# Patient Record
Sex: MALE | Race: WHITE
[De-identification: names, ages, dates, MRNs, and addresses within clinical notes are randomized per-mention and may not be internally consistent; named-entity substitution may affect disease eponyms.]

---

## 2022-04-11 ENCOUNTER — HOSPITAL ENCOUNTER (EMERGENCY)
Dept: HOSPITAL 49 - FER | Age: 52
Discharge: HOME | End: 2022-04-11
Payer: COMMERCIAL

## 2022-04-11 DIAGNOSIS — R16.2: ICD-10-CM

## 2022-04-11 DIAGNOSIS — M51.36: Primary | ICD-10-CM

## 2022-04-11 DIAGNOSIS — I10: ICD-10-CM

## 2022-04-11 LAB
ALBUMIN SERPL-MCNC: 3.3 G/DL (ref 3.4–5)
ALKALINE PHOSHATASE: 118 U/L (ref 46–116)
ALT SERPL-CCNC: 56 U/L (ref 16–63)
AST: 59 U/L (ref 15–37)
BASOPHIL: 0.6 % (ref 0–2)
BILIRUBIN - TOTAL: 0.5 MG/DL (ref 0.2–1)
BILIRUBIN: NEGATIVE MG/DL
BLOOD: NEGATIVE ERY/UL
BUN SERPL-MCNC: 17 MG/DL (ref 7–18)
BUN/CREAT RATIO (CALC): 18.3 RATIO
CHLORIDE: 96 MMOL/L (ref 98–107)
CLARITY UR: CLEAR
CO2 (BICARBONATE): 28 MMOL/L (ref 21–32)
COLOR: YELLOW
CREATININE: 0.93 MG/DL (ref 0.67–1.17)
EOSINOPHIL: 2.8 % (ref 0–5)
GLOBULIN (CALCULATION): 3.8 G/DL
GLUCOSE (U): NORMAL MG/DL
GLUCOSE SERPL-MCNC: 138 MG/DL (ref 74–106)
HCT: 40 % (ref 42–52)
HGB BLD-MCNC: 13.9 G/DL (ref 13.2–18)
LEUKOCYTES: NEGATIVE LEU/UL
LYMPHOCYTE: 18.4 % (ref 15–48)
MCH RBC QN AUTO: 35.4 PG (ref 25–31)
MCHC RBC AUTO-ENTMCNC: 34.8 G/DL (ref 32–36)
MCV: 101.8 FL (ref 78–100)
MONOCYTE: 10.8 % (ref 0–12)
MPV: 9.5 FL (ref 6–9.5)
NEUTROPHIL: 66.6 % (ref 41–80)
NITRITE: NEGATIVE MG/DL
NRBC: 0
PLT: 104 K/UL (ref 150–400)
POTASSIUM: 4.3 MMOL/L (ref 3.5–5.1)
PROTEIN: NEGATIVE MG/DL
RBC MORPHOLOGY: NORMAL
RBC: 3.93 M/UL (ref 4.7–6)
RDW: 13.4 % (ref 11.5–14)
SPECIFIC GRAVITY: 1.01 (ref 1–1.03)
TOTAL PROTEIN: 7.1 G/DL (ref 6.4–8.2)
UROBILINOGEN: 0.2 MG/DL (ref 0.2–1)
WBC: 5 K/UL (ref 4–10.5)

## 2022-08-22 ENCOUNTER — HOSPITAL ENCOUNTER (INPATIENT)
Dept: HOSPITAL 49 - FER | Age: 52
LOS: 3 days | Discharge: HOME | DRG: 871 | End: 2022-08-25
Attending: INTERNAL MEDICINE | Admitting: INTERNAL MEDICINE
Payer: COMMERCIAL

## 2022-08-22 VITALS — WEIGHT: 315 LBS | BODY MASS INDEX: 46.65 KG/M2 | HEIGHT: 69.02 IN

## 2022-08-22 DIAGNOSIS — R65.20: ICD-10-CM

## 2022-08-22 DIAGNOSIS — H05.20: ICD-10-CM

## 2022-08-22 DIAGNOSIS — J18.9: ICD-10-CM

## 2022-08-22 DIAGNOSIS — I11.9: ICD-10-CM

## 2022-08-22 DIAGNOSIS — A41.9: Primary | ICD-10-CM

## 2022-08-22 DIAGNOSIS — Z20.822: ICD-10-CM

## 2022-08-22 DIAGNOSIS — E66.2: ICD-10-CM

## 2022-08-22 DIAGNOSIS — I89.0: ICD-10-CM

## 2022-08-22 DIAGNOSIS — E87.2: ICD-10-CM

## 2022-08-22 DIAGNOSIS — Z82.49: ICD-10-CM

## 2022-08-22 DIAGNOSIS — J44.0: ICD-10-CM

## 2022-08-22 DIAGNOSIS — J44.1: ICD-10-CM

## 2022-08-22 DIAGNOSIS — Z88.0: ICD-10-CM

## 2022-08-22 DIAGNOSIS — Z79.899: ICD-10-CM

## 2022-08-22 DIAGNOSIS — H53.2: ICD-10-CM

## 2022-08-22 DIAGNOSIS — D69.6: ICD-10-CM

## 2022-08-22 DIAGNOSIS — E03.9: ICD-10-CM

## 2022-08-22 DIAGNOSIS — Z87.891: ICD-10-CM

## 2022-08-22 DIAGNOSIS — R82.81: ICD-10-CM

## 2022-08-22 DIAGNOSIS — E87.1: ICD-10-CM

## 2022-08-22 DIAGNOSIS — Z99.81: ICD-10-CM

## 2022-08-22 DIAGNOSIS — J20.9: ICD-10-CM

## 2022-08-22 DIAGNOSIS — J96.02: ICD-10-CM

## 2022-08-22 DIAGNOSIS — J96.01: ICD-10-CM

## 2022-08-22 DIAGNOSIS — E83.42: ICD-10-CM

## 2022-08-22 LAB
ALBUMIN SERPL-MCNC: 3.3 G/DL (ref 3.4–5)
ALKALINE PHOSHATASE: 108 U/L (ref 46–116)
ALT SERPL-CCNC: 69 U/L (ref 16–63)
AST: 53 U/L (ref 15–37)
BACTERIA: (no result)
BASOPHIL: 0.8 % (ref 0–2)
BILIRUBIN - TOTAL: 0.4 MG/DL (ref 0.2–1)
BILIRUBIN: NEGATIVE MG/DL
BLOOD: NEGATIVE ERY/UL
BUN SERPL-MCNC: 20 MG/DL (ref 7–18)
BUN/CREAT RATIO (CALC): 19 RATIO
CHLORIDE: 92 MMOL/L (ref 98–107)
CLARITY UR: (no result)
CO2 (BICARBONATE): 29 MMOL/L (ref 21–32)
COLOR: YELLOW
CREATININE: 1.05 MG/DL (ref 0.67–1.17)
D DIMER PPP FEU-MCNC: 0.8 UG/MLFEU (ref 0–0.41)
EOSINOPHIL: 2.7 % (ref 0–5)
GLOBULIN (CALCULATION): 3.5 G/DL
GLUCOSE (U): NORMAL MG/DL
GLUCOSE SERPL-MCNC: 117 MG/DL (ref 74–106)
HCT: 43.6 % (ref 42–52)
HGB BLD-MCNC: 15 G/DL (ref 13.2–18)
INR PPP: 1.02 (ref 0.9–1.2)
LACTIC ACID: 2.6 MMOL/L (ref 0.4–1.9)
LEUKOCYTES: NEGATIVE LEU/UL
LYMPHOCYTE: 20 % (ref 15–48)
MAGNESIUM SERPL-MCNC: 1.3 MG/DL (ref 1.8–2.4)
MCH RBC QN AUTO: 35 PG (ref 25–31)
MCHC RBC AUTO-ENTMCNC: 34.4 G/DL (ref 32–36)
MCV: 101.9 FL (ref 78–100)
MONOCYTE: 14.1 % (ref 0–12)
MPV: 9.4 FL (ref 6–9.5)
NEUTROPHIL: 61.6 % (ref 41–80)
NITRITE: NEGATIVE MG/DL
NRBC: 0
PLT: 101 K/UL (ref 150–400)
POTASSIUM: 3.8 MMOL/L (ref 3.5–5.1)
PROTEIN: (no result) MG/DL
PROTHROMBIN TIME: 13.1 SECONDS (ref 11.9–13.9)
PTT: 32 SECONDS (ref 24.9–34.6)
RBC MORPHOLOGY: NORMAL
RBC: 4.28 M/UL (ref 4.7–6)
RDW: 14 % (ref 11.5–14)
SPECIFIC GRAVITY: >=1.03 (ref 1–1.03)
SQUAMOUS EPITHELIAL CELLS: (no result)
TOTAL PROTEIN: 6.8 G/DL (ref 6.4–8.2)
URINARY WBC: (no result)
UROBILINOGEN: 0.2 MG/DL (ref 0.2–1)
WBC: 5.2 K/UL (ref 4–10.5)

## 2022-08-22 PROCEDURE — 5A09357 ASSISTANCE WITH RESPIRATORY VENTILATION, LESS THAN 24 CONSECUTIVE HOURS, CONTINUOUS POSITIVE AIRWAY PRESSURE: ICD-10-PCS | Performed by: EMERGENCY MEDICINE

## 2022-08-22 PROCEDURE — U0002 COVID-19 LAB TEST NON-CDC: HCPCS

## 2022-08-22 PROCEDURE — 3E03329 INTRODUCTION OF OTHER ANTI-INFECTIVE INTO PERIPHERAL VEIN, PERCUTANEOUS APPROACH: ICD-10-PCS | Performed by: INTERNAL MEDICINE

## 2022-08-23 LAB
BASOPHIL: 0.2 % (ref 0–2)
BUN SERPL-MCNC: 23 MG/DL (ref 7–18)
BUN/CREAT RATIO (CALC): 27.7 RATIO
CHLORIDE: 96 MMOL/L (ref 98–107)
CO2 (BICARBONATE): 33 MMOL/L (ref 21–32)
CREATININE: 0.83 MG/DL (ref 0.67–1.17)
EOSINOPHIL: 0 % (ref 0–5)
FT4 (FREE T4): 1 NG/DL (ref 0.76–1.46)
GLUCOSE SERPL-MCNC: 177 MG/DL (ref 74–106)
HCT: 44.7 % (ref 42–52)
HGB BLD-MCNC: 15.5 G/DL (ref 13.2–18)
LYMPHOCYTE: 8.4 % (ref 15–48)
MAGNESIUM SERPL-MCNC: 2.1 MG/DL (ref 1.8–2.4)
MCH RBC QN AUTO: 35.5 PG (ref 25–31)
MCHC RBC AUTO-ENTMCNC: 34.7 G/DL (ref 32–36)
MCV: 102.3 FL (ref 78–100)
MONOCYTE: 2.8 % (ref 0–12)
MPV: 9.8 FL (ref 6–9.5)
NEUTROPHIL: 87.6 % (ref 41–80)
NRBC: 0
PLT: 98 K/UL (ref 150–400)
POTASSIUM: 4.7 MMOL/L (ref 3.5–5.1)
RBC MORPHOLOGY: NORMAL
RBC: 4.37 M/UL (ref 4.7–6)
RDW: 13.8 % (ref 11.5–14)
WBC: 6 K/UL (ref 4–10.5)

## 2022-08-23 PROCEDURE — B24BZZZ ULTRASONOGRAPHY OF HEART WITH AORTA: ICD-10-PCS

## 2022-08-23 NOTE — NUR
8/23 Mr. Taylor lives alone. He is independent and employed. Will monitor for
02 needs. Patient chose [facility] as the provider if 02 is needed.

## 2022-08-24 LAB
BASOPHIL: 0.1 % (ref 0–2)
BUN SERPL-MCNC: 28 MG/DL (ref 7–18)
BUN/CREAT RATIO (CALC): 31.8 RATIO
C-REACTIVE PROTEIN: 0.5 MG/DL (ref ?–0.9)
CHLORIDE: 99 MMOL/L (ref 98–107)
CO2 (BICARBONATE): 31 MMOL/L (ref 21–32)
CREATININE: 0.88 MG/DL (ref 0.67–1.17)
EOSINOPHIL: 0 % (ref 0–5)
GLUCOSE SERPL-MCNC: 168 MG/DL (ref 74–106)
HCT: 44.6 % (ref 42–52)
HGB BLD-MCNC: 14.8 G/DL (ref 13.2–18)
LYMPHOCYTE: 5.4 % (ref 15–48)
MAGNESIUM SERPL-MCNC: 1.8 MG/DL (ref 1.8–2.4)
MCH RBC QN AUTO: 34.6 PG (ref 25–31)
MCHC RBC AUTO-ENTMCNC: 33.2 G/DL (ref 32–36)
MCV: 104.2 FL (ref 78–100)
MONOCYTE: 5.3 % (ref 0–12)
MPV: 9.7 FL (ref 6–9.5)
NEUTROPHIL: 88.7 % (ref 41–80)
NRBC: 0
PLT: 113 K/UL (ref 150–400)
POTASSIUM: 4.6 MMOL/L (ref 3.5–5.1)
RBC MORPHOLOGY: NORMAL
RBC: 4.28 M/UL (ref 4.7–6)
RDW: 14 % (ref 11.5–14)
WBC: 11.5 K/UL (ref 4–10.5)

## 2022-08-24 PROCEDURE — 5A09357 ASSISTANCE WITH RESPIRATORY VENTILATION, LESS THAN 24 CONSECUTIVE HOURS, CONTINUOUS POSITIVE AIRWAY PRESSURE: ICD-10-PCS | Performed by: INTERNAL MEDICINE

## 2022-08-25 NOTE — NUR
WENT OVER DISCHARGE INSTRUCTIONS WITH PATIENT. VERBALIZED UNDERSTANDING. IV
AND MONITOR REMOVED. HOME OXYGEN PORTABLE TANK AT BEDSIDE TO BE WORN HOME.
HOME 02 ARRANGED WITH GOVERITOS. PATIENT DISCHARGED VIA [facility].

## 2022-08-25 NOTE — NUR
8/25/22 [facility] provided 02 at 2 L. A referral was made to the Resource
 to schedule a Pulmonology appointment.

## 2022-09-11 ENCOUNTER — HOSPITAL ENCOUNTER (EMERGENCY)
Dept: HOSPITAL 49 - FER | Age: 52
Discharge: ANOTHER HOSPITAL | End: 2022-09-11
Payer: COMMERCIAL

## 2022-09-11 DIAGNOSIS — J18.9: ICD-10-CM

## 2022-09-11 DIAGNOSIS — Z20.822: ICD-10-CM

## 2022-09-11 DIAGNOSIS — R65.20: ICD-10-CM

## 2022-09-11 DIAGNOSIS — N17.9: ICD-10-CM

## 2022-09-11 DIAGNOSIS — J44.0: ICD-10-CM

## 2022-09-11 DIAGNOSIS — N39.0: ICD-10-CM

## 2022-09-11 DIAGNOSIS — J96.00: ICD-10-CM

## 2022-09-11 DIAGNOSIS — A41.9: Primary | ICD-10-CM

## 2022-09-11 DIAGNOSIS — I50.9: ICD-10-CM

## 2022-09-11 LAB
ALBUMIN SERPL-MCNC: 3.1 G/DL (ref 3.4–5)
ALKALINE PHOSHATASE: 121 U/L (ref 46–116)
ALT SERPL-CCNC: 49 U/L (ref 16–63)
AMORPH URATE CRY #/AREA URNS HPF: (no result) /[HPF]
AMPHETAMINES: NEGATIVE
AST: 61 U/L (ref 15–37)
BACTERIA: (no result)
BARBITURATES: NEGATIVE
BASOPHIL: 0.3 % (ref 0–2)
BILIRUBIN - TOTAL: 0.6 MG/DL (ref 0.2–1)
BILIRUBIN: (no result) MG/DL
BLOOD: NEGATIVE ERY/UL
BUN SERPL-MCNC: 40 MG/DL (ref 7–18)
BUN SERPL-MCNC: 40 MG/DL (ref 7–18)
BUN/CREAT RATIO (CALC): 6.4 RATIO
BUN/CREAT RATIO (CALC): 6.7 RATIO
C-REACTIVE PROTEIN: 12 MG/DL (ref ?–0.9)
CHLORIDE: 97 MMOL/L (ref 98–107)
CHLORIDE: 97 MMOL/L (ref 98–107)
CLARITY UR: CLEAR
CO2 (BICARBONATE): 29 MMOL/L (ref 21–32)
CO2 (BICARBONATE): 29 MMOL/L (ref 21–32)
COLOR: YELLOW
CORONAVIRUS 2019 SARS-COV-2: NEGATIVE
CREATININE: 6.01 MG/DL (ref 0.67–1.17)
CREATININE: 6.23 MG/DL (ref 0.67–1.17)
ECSTASY (MDMA): NEGATIVE
EOSINOPHIL: 0.5 % (ref 0–5)
GLOBULIN (CALCULATION): 3.6 G/DL
GLUCOSE (U): NORMAL MG/DL
GLUCOSE SERPL-MCNC: 153 MG/DL (ref 74–106)
GLUCOSE SERPL-MCNC: 164 MG/DL (ref 74–106)
HCT: 40.3 % (ref 42–52)
HGB BLD-MCNC: 13.5 G/DL (ref 13.2–18)
INFLUENZA A NAA: NEGATIVE
INR PPP: 1.07 (ref 0.9–1.2)
LEUKOCYTES: NEGATIVE LEU/UL
LYMPHOCYTE: 9.3 % (ref 15–48)
MARIJUANA (THC): NEGATIVE
MCH RBC QN AUTO: 35 PG (ref 25–31)
MCHC RBC AUTO-ENTMCNC: 33.5 G/DL (ref 32–36)
MCV: 104.4 FL (ref 78–100)
METHADONE: NEGATIVE
MONOCYTE: 8.1 % (ref 0–12)
MPV: 10.1 FL (ref 6–9.5)
NEUTROPHIL: 81 % (ref 41–80)
NITRITE: NEGATIVE MG/DL
NRBC: 0
OPIATES: POSITIVE
OXYCODONE: NEGATIVE
PLT: 105 K/UL (ref 150–400)
POTASSIUM: 6 MMOL/L (ref 3.5–5.1)
POTASSIUM: 6.4 MMOL/L (ref 3.5–5.1)
PROTEIN: (no result) MG/DL
PROTHROMBIN TIME: 13.6 SECONDS (ref 11.9–13.9)
PTT: 31.4 SECONDS (ref 24.9–34.6)
RBC MORPHOLOGY: NORMAL
RBC: 3.86 M/UL (ref 4.7–6)
RDW: 14.6 % (ref 11.5–14)
RENAL EPITHELIAL CELLS: (no result)
SPECIFIC GRAVITY: >=1.03 (ref 1–1.03)
SQUAMOUS EPITHELIAL CELLS: (no result)
TOTAL PROTEIN: 6.7 G/DL (ref 6.4–8.2)
URINARY RBC: (no result)
URINARY WBC: (no result)
UROBILINOGEN: 1 MG/DL (ref 0.2–1)
WBC: 10.5 K/UL (ref 4–10.5)

## 2022-09-11 PROCEDURE — U0002 COVID-19 LAB TEST NON-CDC: HCPCS

## 2022-09-12 LAB
BUN SERPL-MCNC: 43 MG/DL (ref 7–18)
BUN/CREAT RATIO (CALC): 7.3 RATIO
CHLORIDE: 98 MMOL/L (ref 98–107)
CO2 (BICARBONATE): 28 MMOL/L (ref 21–32)
CREATININE: 5.89 MG/DL (ref 0.67–1.17)
GLUCOSE SERPL-MCNC: 177 MG/DL (ref 74–106)
POTASSIUM: 5.8 MMOL/L (ref 3.5–5.1)